# Patient Record
Sex: MALE | Race: WHITE
[De-identification: names, ages, dates, MRNs, and addresses within clinical notes are randomized per-mention and may not be internally consistent; named-entity substitution may affect disease eponyms.]

---

## 2017-01-09 ENCOUNTER — HOSPITAL ENCOUNTER (OUTPATIENT)
Dept: HOSPITAL 45 - C.LAB | Age: 67
Discharge: HOME | End: 2017-01-09
Attending: UROLOGY
Payer: COMMERCIAL

## 2017-01-09 DIAGNOSIS — R97.20: Primary | ICD-10-CM

## 2017-01-09 LAB
PSA FREE MFR SERPL: 12.2 %
PSA FREE SERPL-MCNC: 1.5 NG/ML
PSA SERPL-MCNC: 12.3 NG/ML (ref 0–4)

## 2017-01-11 ENCOUNTER — HOSPITAL ENCOUNTER (OUTPATIENT)
Dept: HOSPITAL 45 - C.PATHSPEC | Age: 67
Discharge: HOME | End: 2017-01-11
Attending: UROLOGY
Payer: COMMERCIAL

## 2017-01-11 DIAGNOSIS — C61: Primary | ICD-10-CM

## 2017-01-17 ENCOUNTER — HOSPITAL ENCOUNTER (OUTPATIENT)
Dept: HOSPITAL 45 - C.LAB | Age: 67
Discharge: HOME | End: 2017-01-17
Attending: UROLOGY
Payer: COMMERCIAL

## 2017-01-17 DIAGNOSIS — R39.15: ICD-10-CM

## 2017-01-17 DIAGNOSIS — C61: ICD-10-CM

## 2017-01-17 DIAGNOSIS — R97.20: Primary | ICD-10-CM

## 2017-01-19 ENCOUNTER — HOSPITAL ENCOUNTER (OUTPATIENT)
Dept: HOSPITAL 45 - C.NUCL | Age: 67
Discharge: HOME | End: 2017-01-19
Attending: UROLOGY
Payer: COMMERCIAL

## 2017-01-19 DIAGNOSIS — C61: Primary | ICD-10-CM

## 2017-01-19 LAB
BUN SERPL-MCNC: 14 MG/DL (ref 7–18)
BUN/CREAT SERPL: 15.2 (ref 10–20)
CREAT SERPL-MCNC: 0.92 MG/DL (ref 0.6–1.4)

## 2017-01-19 NOTE — DIAGNOSTIC IMAGING REPORT
BONE SCAN WHOLE BODY



CLINICAL HISTORY: Prostate carcinoma    



COMPARISON STUDY:  No previous studies for comparison.



FINDINGS: The patient was injected with 27 mCi of technetium 99 M MDP.

Three-hour delayed whole body images were acquired. Skeletal uptake appears

symmetric. There are no foci of increased activity viewed as suspicious for

metastatic disease.



IMPRESSION:  No scintigraphic evidence of skeletal metastasis. 









Electronically signed by:  Thee Carrillo M.D.

1/19/2017 3:48 PM



Dictated Date/Time:  1/19/2017 3:47 PM

## 2017-01-23 ENCOUNTER — HOSPITAL ENCOUNTER (OUTPATIENT)
Dept: HOSPITAL 45 - C.CTS | Age: 67
Discharge: HOME | End: 2017-01-23
Attending: UROLOGY
Payer: COMMERCIAL

## 2017-01-23 DIAGNOSIS — C61: Primary | ICD-10-CM

## 2017-01-23 NOTE — DIAGNOSTIC IMAGING REPORT
CT OF THE ABDOMEN AND PELVIS WITH CONTRAST



CLINICAL HISTORY: Prostate cancer.    



COMPARISON STUDY:  Whole-body bone scan January 19, 2017.



TECHNIQUE: Following IV administration of 91 mL of Optiray-320, axial images of

the abdomen and pelvis were obtained from the lung bases to the proximal femurs.

Images were reviewed in the axial, sagittal, and coronal planes. IV contrast was

administered without complication.



CT DOSE: 317.06 mGy.cm



FINDINGS: Lung bases are clear. Several subcentimeter hepatic lesions are too

small to characterize but likely reflect cysts. The spleen, adrenal glands and

pancreas are normal. There are numerous bilateral renal cysts, the largest of

which is a 5.2 cm cyst arising from the lower pole of the right kidney. There is

no hydronephrosis. Caliber and wall thickness of small and large bowel are

normal. The appendix is normal. There is a left-sided inferior vena cava. No

enlarged abdominal or pelvic lymph nodes are present. There are findings

suggestive of a left inguinal hernia repair. Note is made of a subtle 2.9 cm

enhancing focus within the left aspect of the prostate gland. No suspicious

osseous lesions are present. There is no hydronephrosis.







IMPRESSION:  



1. No evidence of metastatic disease within the abdomen or pelvis.



2. Subtle 2.9 cm enhancing focus within the left aspect of the prostate gland.

Although suboptimally assessed by CT, this could reflect the known primary

tumor.



3. Multiple bilateral renal cysts.







Electronically signed by:  Donny Snyder M.D.

1/23/2017 9:49 AM



Dictated Date/Time:  1/23/2017 9:36 AM

## 2017-02-27 LAB
ANION GAP SERPL CALC-SCNC: 4 MMOL/L (ref 3–11)
APPEARANCE UR: CLEAR
BASOPHILS # BLD: 0.01 K/UL (ref 0–0.2)
BASOPHILS NFR BLD: 0.2 %
BILIRUB UR-MCNC: (no result) MG/DL
BUN SERPL-MCNC: 17 MG/DL (ref 7–18)
BUN/CREAT SERPL: 16.9 (ref 10–20)
CALCIUM SERPL-MCNC: 9 MG/DL (ref 8.5–10.1)
CHLORIDE SERPL-SCNC: 103 MMOL/L (ref 98–107)
CO2 SERPL-SCNC: 33 MMOL/L (ref 21–32)
COLOR UR: YELLOW
COMPLETE: YES
CREAT CL PREDICTED SERPL C-G-VRATE: 74.6 ML/MIN
CREAT SERPL-MCNC: 1 MG/DL (ref 0.6–1.4)
EOSINOPHIL NFR BLD AUTO: 164 K/UL (ref 130–400)
GLUCOSE SERPL-MCNC: 99 MG/DL (ref 70–99)
HCT VFR BLD CALC: 43.3 % (ref 42–52)
IG%: 0.2 %
IMM GRANULOCYTES NFR BLD AUTO: 32.9 %
LYMPHOCYTES # BLD: 1.59 K/UL (ref 1.2–3.4)
MANUAL MICROSCOPIC REQUIRED?: NO
MCH RBC QN AUTO: 31.8 PG (ref 25–34)
MCHC RBC AUTO-ENTMCNC: 34.6 G/DL (ref 32–36)
MCV RBC AUTO: 91.7 FL (ref 80–100)
MONOCYTES NFR BLD: 9.1 %
NEUTROPHILS # BLD AUTO: 1.4 %
NEUTROPHILS NFR BLD AUTO: 56.2 %
NITRITE UR QL STRIP: (no result)
PH UR STRIP: 5 [PH] (ref 4.5–7.5)
PMV BLD AUTO: 9.3 FL (ref 7.4–10.4)
POTASSIUM SERPL-SCNC: 4.7 MMOL/L (ref 3.5–5.1)
RBC # BLD AUTO: 4.72 M/UL (ref 4.7–6.1)
REVIEW REQ?: NO
SODIUM SERPL-SCNC: 140 MMOL/L (ref 136–145)
SP GR UR STRIP: 1.02 (ref 1–1.03)
URINE BILL WITH OR WITHOUT MIC: (no result)
UROBILINOGEN UR-MCNC: (no result) MG/DL
WBC # BLD AUTO: 4.84 K/UL (ref 4.8–10.8)

## 2017-02-27 NOTE — PAT MEDICATION INSTRUCTIONS
Service Date


Feb 27, 2017.





Current Home Medication List


Atorvastatin (Lipitor), 1 TAB PO QAM


Calcium Carbonate (Tums), 1-2 TAB PO PRN


Ocuvite Preservision (Ocuvite Preservision), 1 TAB PO QAM





Medication Instructions


For Your Scheduled Surgery 








- Hold the following medications the morning of surgery:


   Calcium Carbonate (Tums), 1-2 TAB PO PRN


   Ocuvite Preservision (Ocuvite Preservision), 1 TAB PO QAM





- Take the following medications the evening prior to surgery:


   Atorvastatin (Lipitor), 1 TAB PO QPM














If you have any questions please call us at [phone] or [phone] (

Kacie) or [phone]

## 2017-02-27 NOTE — DIAGNOSTIC IMAGING REPORT
TWO VIEW CHEST



CLINICAL HISTORY: Preoperative examination.



FINDINGS: PA and lateral chest radiographs are obtained. No prior studies are

available for comparison at the time of dictation.  The cardiomediastinal

silhouette is unremarkable. The lungs appear hyperinflated and hyperlucent with

flattening the diaphragm and increased retrosternal clear space. The appearance

suggests emphysema. Nonspecific interstitial thickening is noted. There is no

airspace consolidation, large pleural effusion, or pneumothorax. The skeletal

structures are osteopenic. Mild degenerative change is noted throughout the

thoracic spine.



IMPRESSION: Findings suggest emphysema. There is no acute cardiopulmonary

abnormality.







Electronically signed by:  Christiano Jessica M.D.

2/27/2017 9:56 AM



Dictated Date/Time:  2/27/2017 9:55 AM

## 2017-03-07 ENCOUNTER — HOSPITAL ENCOUNTER (INPATIENT)
Dept: HOSPITAL 45 - C.ACU | Age: 67
LOS: 1 days | Discharge: HOME | DRG: 708 | End: 2017-03-08
Attending: UROLOGY | Admitting: UROLOGY
Payer: COMMERCIAL

## 2017-03-07 VITALS
HEART RATE: 97 BPM | OXYGEN SATURATION: 98 % | TEMPERATURE: 98.24 F | SYSTOLIC BLOOD PRESSURE: 117 MMHG | DIASTOLIC BLOOD PRESSURE: 63 MMHG

## 2017-03-07 VITALS
SYSTOLIC BLOOD PRESSURE: 142 MMHG | OXYGEN SATURATION: 97 % | TEMPERATURE: 97.7 F | HEART RATE: 81 BPM | DIASTOLIC BLOOD PRESSURE: 68 MMHG

## 2017-03-07 VITALS
SYSTOLIC BLOOD PRESSURE: 109 MMHG | DIASTOLIC BLOOD PRESSURE: 57 MMHG | TEMPERATURE: 98.42 F | OXYGEN SATURATION: 94 % | HEART RATE: 67 BPM

## 2017-03-07 VITALS — SYSTOLIC BLOOD PRESSURE: 147 MMHG | OXYGEN SATURATION: 97 % | TEMPERATURE: 98.24 F | DIASTOLIC BLOOD PRESSURE: 89 MMHG

## 2017-03-07 VITALS
BODY MASS INDEX: 23.12 KG/M2 | HEIGHT: 71 IN | BODY MASS INDEX: 23.12 KG/M2 | WEIGHT: 165.13 LBS | WEIGHT: 165.13 LBS | HEIGHT: 71 IN

## 2017-03-07 VITALS
TEMPERATURE: 97.88 F | HEART RATE: 80 BPM | OXYGEN SATURATION: 98 % | SYSTOLIC BLOOD PRESSURE: 141 MMHG | DIASTOLIC BLOOD PRESSURE: 73 MMHG

## 2017-03-07 VITALS
SYSTOLIC BLOOD PRESSURE: 110 MMHG | OXYGEN SATURATION: 94 % | TEMPERATURE: 98.42 F | DIASTOLIC BLOOD PRESSURE: 51 MMHG | HEART RATE: 83 BPM

## 2017-03-07 VITALS
HEART RATE: 74 BPM | OXYGEN SATURATION: 98 % | SYSTOLIC BLOOD PRESSURE: 157 MMHG | DIASTOLIC BLOOD PRESSURE: 78 MMHG | TEMPERATURE: 97.52 F

## 2017-03-07 VITALS
HEART RATE: 76 BPM | SYSTOLIC BLOOD PRESSURE: 153 MMHG | OXYGEN SATURATION: 98 % | TEMPERATURE: 97.34 F | DIASTOLIC BLOOD PRESSURE: 85 MMHG

## 2017-03-07 DIAGNOSIS — Z79.899: ICD-10-CM

## 2017-03-07 DIAGNOSIS — R39.15: ICD-10-CM

## 2017-03-07 DIAGNOSIS — M19.90: ICD-10-CM

## 2017-03-07 DIAGNOSIS — E78.5: ICD-10-CM

## 2017-03-07 DIAGNOSIS — Z80.52: ICD-10-CM

## 2017-03-07 DIAGNOSIS — C61: Primary | ICD-10-CM

## 2017-03-07 LAB
ANION GAP SERPL CALC-SCNC: 9 MMOL/L (ref 3–11)
BUN SERPL-MCNC: 15 MG/DL (ref 7–18)
BUN/CREAT SERPL: 15.4 (ref 10–20)
CALCIUM SERPL-MCNC: 8.4 MG/DL (ref 8.5–10.1)
CHLORIDE SERPL-SCNC: 105 MMOL/L (ref 98–107)
CO2 SERPL-SCNC: 26 MMOL/L (ref 21–32)
CREAT CL PREDICTED SERPL C-G-VRATE: 74.6 ML/MIN
CREAT SERPL-MCNC: 1 MG/DL (ref 0.6–1.4)
EOSINOPHIL NFR BLD AUTO: 133 K/UL (ref 130–400)
GLUCOSE SERPL-MCNC: 126 MG/DL (ref 70–99)
HCT VFR BLD CALC: 40 % (ref 42–52)
MCH RBC QN AUTO: 31.4 PG (ref 25–34)
MCHC RBC AUTO-ENTMCNC: 34.8 G/DL (ref 32–36)
MCV RBC AUTO: 90.3 FL (ref 80–100)
PMV BLD AUTO: 8.9 FL (ref 7.4–10.4)
POTASSIUM SERPL-SCNC: 4.2 MMOL/L (ref 3.5–5.1)
RBC # BLD AUTO: 4.43 M/UL (ref 4.7–6.1)
SODIUM SERPL-SCNC: 140 MMOL/L (ref 136–145)
WBC # BLD AUTO: 6.65 K/UL (ref 4.8–10.8)

## 2017-03-07 PROCEDURE — 0VT34ZZ RESECTION OF BILATERAL SEMINAL VESICLES, PERCUTANEOUS ENDOSCOPIC APPROACH: ICD-10-PCS | Performed by: UROLOGY

## 2017-03-07 PROCEDURE — 8E0W4CZ ROBOTIC ASSISTED PROCEDURE OF TRUNK REGION, PERCUTANEOUS ENDOSCOPIC APPROACH: ICD-10-PCS | Performed by: UROLOGY

## 2017-03-07 PROCEDURE — 07BC4ZX EXCISION OF PELVIS LYMPHATIC, PERCUTANEOUS ENDOSCOPIC APPROACH, DIAGNOSTIC: ICD-10-PCS | Performed by: UROLOGY

## 2017-03-07 PROCEDURE — 0VT04ZZ RESECTION OF PROSTATE, PERCUTANEOUS ENDOSCOPIC APPROACH: ICD-10-PCS | Performed by: UROLOGY

## 2017-03-07 RX ADMIN — HEPARIN SODIUM SCH UNIT: 10000 INJECTION, SOLUTION INTRAVENOUS; SUBCUTANEOUS at 22:27

## 2017-03-07 RX ADMIN — ACETAMINOPHEN SCH MG: 500 TABLET, COATED ORAL at 22:24

## 2017-03-07 RX ADMIN — SODIUM CHLORIDE, SODIUM LACTATE, POTASSIUM CHLORIDE, AND CALCIUM CHLORIDE SCH MLS/HR: 600; 310; 30; 20 INJECTION, SOLUTION INTRAVENOUS at 16:09

## 2017-03-07 RX ADMIN — CEFAZOLIN SCH MLS/HR: 10 INJECTION, POWDER, FOR SOLUTION INTRAVENOUS at 20:37

## 2017-03-07 RX ADMIN — DOCUSATE SODIUM SCH MG: 100 CAPSULE, LIQUID FILLED ORAL at 20:39

## 2017-03-07 RX ADMIN — SODIUM CHLORIDE, SODIUM LACTATE, POTASSIUM CHLORIDE, AND CALCIUM CHLORIDE SCH MLS/HR: 600; 310; 30; 20 INJECTION, SOLUTION INTRAVENOUS at 20:38

## 2017-03-07 RX ADMIN — ACETAMINOPHEN SCH MG: 500 TABLET, COATED ORAL at 16:42

## 2017-03-07 NOTE — [facility] POST OPERATIVE BRIEF NOTE
Immediate Operative Summary


Operative Date


Mar 7, 2017.





Pre-Operative Diagnosis





Prostate cancer





Post-Operative Diagnosis





Same





Procedure(s) Performed





Robotic Assisted Laparoscopic Prostatectomy with Lymph node dissection





Surgeon


Dr Ku





Assistant Surgeon(s)


Naa REIS





Estimated Blood Loss


100ML





Findings


As per dictation.





Specimens





A. Periprosthetic fat





B. Prostate and seminal vesicles





C. Right pelvic lymph node has clip





D. Bladder neck margin





E. Left pelvic lymph node





F. Apical margin





Drains


ESSENCE; potter





Anesthesia


Gen





Complication(s)


None





Disposition


Recovery Room / PACU (stable)

## 2017-03-07 NOTE — ANESTHESIOLOGY PROGRESS NOTE
Anesthesia Post Op Note


Date & Time


Mar 7, 2017 at 16:24





Vital Signs


Pain Intensity:  0





 Vital Signs Past 12 Hours








  Date Time  Temp Pulse Resp B/P Pulse Ox O2 Delivery O2 Flow Rate FiO2


 


3/7/17 16:12 36.3 76 17 153/85 98 Nasal Cannula 2.0 


 


3/7/17 15:25  71 20 146/72 99 Nasal Cannula 2 


 


3/7/17 15:15 36.4 71 18 153/79 98 Nasal Cannula 2 


 


3/7/17 15:05  77 14 159/90 98 Nasal Cannula 2 


 


3/7/17 14:55  76 12 157/86 99 Mask 10 


 


3/7/17 14:45  79 16 160/77 100 Mask 10 


 


3/7/17 14:37 36.7 92 16 160/99 98 Mask 10 


 


3/7/17 10:42 36.8  16 147/89 97 Room Air  











Notes


Mental Status:  alert / awake / arousable, participated in evaluation


Pt Amnestic to Procedure:  Yes


Nausea / Vomiting:  adequately controlled


Pain:  adequately controlled


Airway Patency, RR, SpO2:  stable & adequate


BP & HR:  stable & adequate


Hydration State:  stable & adequate


Anesthetic Complications:  no major complications apparent

## 2017-03-07 NOTE — OPERATIVE REPORT
DATE OF OPERATION:  03/07/2017

 

PREOPERATIVE DIAGNOSIS:  Prostate cancer.

 

POSTOPERATIVE DIAGNOSIS:  Prostate cancer.

 

PROCEDURE PERFORMED:  Robotic assisted laparoscopic radical prostatectomy

with bilateral pelvic lymph node dissection.

 

ANESTHESIA:  General.

 

ESTIMATED BLOOD LOSS:  100 mL.

 

URINE OUTPUT:  Not recorded.

 

SPECIMENS:

1.  Periprostatic fat.

2.  Prostate and seminal vesicle.

3.  Right pelvic lymph node.

4.  Left pelvic lymph node.

5.  Bladder neck with prominent margin.

6.  [place] permanent margin.

 

DRAINS:

1.  ESSENCE drain.

2.  Head catheter.

 

PRIMARY SURGEON:  Dr. Krishna Ku.

 

ASSISTANT:  Naa Deutsch.

 

DESCRIPTION OF THE PROCEDURE:  Chava Boswell was identified in the

preoperative holding area.  Appropriate informed consents were reviewed and

completed and the patient was transported to the operating suite.  Upon

arrival, he received appropriate preoperative antibiotics in the form of

Ancef as well as a subcutaneous dose of heparin for DVT prophylaxis. 

Adequate general anesthesia was achieved and the patient was placed in the

dorsal lithotomy position where he was sterilely prepped and draped in a

standard fashion.  I began the case by passing a Veress per umbilicus, and

insufflating the abdomen to 15 mmHg.  I then entered in the infraumbilical

site utilizing a 10 mm 0 degrees lens and a 12 mm Visiport.  Inspection

revealed no evidence of Veress trauma, no gross adhesive disease to preclude

us from placing ports in standard prostatectomy fashion.  These ports were

placed under direct vision without difficulty.  Of note, the patient has had

3 left-sided inguinal hernia repairs and 1 right-sided inguinal hernia

repair.  He is status post left orchiectomy.  He has moderate scarring in the

deep aspects of the pelvis with mesh clearly visible on the left side but

less visible on the right.  I began the case by mobilizing the lateral aspect

of the sigmoid colon to free the left lateral pelvic wall.  After this opened

the pouch of Gilberto, I was able to turn my attention towards control of the

umbilical ligaments just inferior to the umbilicus.  I began on the right.  I

controlled this and then I incised the peritoneum just lateral to it down

towards the medial aspect of the right internal ring.  This incision was

carried down to this area and I stopped my dissection at that point secondary

to some scarring from his prior hernia repair.  I performed the same

procedure on the left controlling the umbilical ligaments just inferior to

the umbilicus, then carrying incision lateral to it through the peritoneum

down to the medial aspect of the internal ring and the vas deferens on the

left.  I then turned my attention to the midline and I carried my dissection

midline between the 2 prior hernia resections until I was able to identify

the pubic symphysis dissect beneath it until I uncovered the anterior surface

of the prostate.  I then worked from medial to lateral to connect my lateral

peritoneal incision and this medial midline incision.  I carefully worked

around the prior hernia repairs and freed the bladder without any

identifiable cystotomies or other complications.  After this portion of the

procedure was completed, I was able to defat the prostate in standard fashion

and passed this off the table as a specimen.  I then opened the endopelvic

fascia, first on the right and the left beginning at the base of the prostate

and moving to the apex.  I was able to preserve all lateral levator

musculature as well as the periurethral musculature.  I placed a dorsal

venous complex stitch utilizing a 0 Vicryl stitch in figure-of-eight fashion.

 There was excellent hemostasis.  I did pack the area next to this with 2

small sheets of Surgicel while I turned my attention to the bilateral lymph

node dissection.

 

I began on the right side.  Anatomically, he has had some changes secondary

to the hernia repair; however, was able to easily identify the right external

iliac artery and just distal to it the iliac vein.  I controlled the

lymphatic packet just below the vein and dissected it behind the vein as well

as lateral.  I carried this as far circumflex vein and distally as far as the

obturator nerve.  Care was used to preserve the nerve.  A clip was placed on

the proximal extent of this packet adjacent to the external iliac closer to

the bifurcation of the iliac vessels.  The specimen was marked with a clip

and placed in the deep pelvis.  I then turned my attention to the left. 

Similar to the right side his anatomy has been distorted secondary to his 3

prior hernia repairs.  I was able to identify the vasculature and utilized

these as my consistent landmark.  I dissected the lymphatic packet off the

inferior and posterior aspect of the external iliac vein and carrying this

again laterally as far, the circumflex vein and distally as far as the

obturator nerve.  The nerve was spared entirely and the proximal extent of

this lymphatic packet was controlled with a Weck clip.  The specimen was left

unmarked and the 2 specimens were collected together in an EndoCatch bag and

passed to the upper abdomen.  I then turned my attention back to the prostate

and bladder neck.  With gentle traction on the Head catheter as well as

lateral to medial traction for my instruments, I was able to demarcate the

bladder neck.  I made an incision anterior just above the anticipated

location of the bladder neck and carried this down until I created a

cystotomy.  I then carefully preserved the bladder neck musculature laterally

before completing my incision of the bladder neck.  I deflated the Head

catheter, passed a balloon through the cystotomy and used it to apply

anterior traction.  I dissected posteriorly to the bladder neck with care to

avoid thinning the bladder.  I carried this posteriorly until I encountered

the bilateral ampule of the vasa.  These were both dissected for

approximately 4 cm before being transected.  I utilized these to help retract

the prostate anteriorly while I dissected the bilateral seminal vesicles. 

There were no complications for this portion of the procedure.  I dissected

posteriorly to the prostate splitting Denonvilliers' fascia with care to

avoid encroachment upon the prostate on the left as this is the side that has

the majority of his cancer.  This dissection was carried distally as far as

possible and laterally as far as possible.  I began a nerve sparing

dissection on the right side from the posterior aspect thinning this pedicle.

 I then controlled the vascular pedicle utilizing a series of Weck clips on

the right before performing a nerve sparing procedure and preserving the vast

majority of the neurovascular bundle on the right.  This preservation was

carried beyond the apex of the prostate and alongside the urethra.  On the

left, I took a slightly wider path and after controlling the vascular pedicle

with a series of Weck clips I split the neurovascular bundle to ensure better

x-ray insulation on the prostate.  This again was carried distal to the

prostate and alongside the urethra.  I controlled the dorsal venous complex

at that time utilizing bipolar electrocautery and I dissected the apex of the

prostate with a combination of cold scissors and monopolar electrocautery.  I

transected the urethra cold over a Head catheter with excellent preservation

of length.  Of note, I did take a small margin of tissue from the apex as

well as a small margin tissue from the bladder neck to be sent for permanent

final margins.  I began a running anastomosis using a double armed V-Loc

stitch beginning at the posterior bladder neck and running this until the

anterior surface of the bladder.  We tested the anastomosis after the

completion and found no evidence of leak.  FloSeal was placed around the

anastomosis and a ESSENCE drain was guided into the left lateral most port.  We

then extracted the specimen through expansion of the infraumbilical incision.

 We closed this incision ultimately with 3 figure-of-eight 0 PDS stitches. 

We infiltrated all incisions with 0.5% Marcaine and closed all incisions with

4-0 Monocryl and Dermabond.  At that time the patient was extubated and taken

to the PACU in stable condition.  There were no complications.

 

 

I attest to the content of the Intraoperative Record and any orders documented therein. Any exceptio
ns are noted below.

## 2017-03-08 VITALS
OXYGEN SATURATION: 97 % | TEMPERATURE: 98.06 F | SYSTOLIC BLOOD PRESSURE: 109 MMHG | DIASTOLIC BLOOD PRESSURE: 59 MMHG | HEART RATE: 64 BPM

## 2017-03-08 VITALS
OXYGEN SATURATION: 97 % | DIASTOLIC BLOOD PRESSURE: 59 MMHG | SYSTOLIC BLOOD PRESSURE: 109 MMHG | HEART RATE: 64 BPM | TEMPERATURE: 98.06 F

## 2017-03-08 VITALS
HEART RATE: 60 BPM | TEMPERATURE: 98.24 F | OXYGEN SATURATION: 98 % | DIASTOLIC BLOOD PRESSURE: 52 MMHG | SYSTOLIC BLOOD PRESSURE: 100 MMHG

## 2017-03-08 VITALS
TEMPERATURE: 98.24 F | SYSTOLIC BLOOD PRESSURE: 111 MMHG | OXYGEN SATURATION: 95 % | DIASTOLIC BLOOD PRESSURE: 51 MMHG | HEART RATE: 65 BPM

## 2017-03-08 VITALS — OXYGEN SATURATION: 98 %

## 2017-03-08 LAB
ANION GAP SERPL CALC-SCNC: 9 MMOL/L (ref 3–11)
BASOPHILS # BLD: 0 K/UL (ref 0–0.2)
BASOPHILS NFR BLD: 0 %
BUN SERPL-MCNC: 14 MG/DL (ref 7–18)
BUN/CREAT SERPL: 14.1 (ref 10–20)
CALCIUM SERPL-MCNC: 7.9 MG/DL (ref 8.5–10.1)
CHLORIDE SERPL-SCNC: 103 MMOL/L (ref 98–107)
CO2 SERPL-SCNC: 28 MMOL/L (ref 21–32)
COMPLETE: YES
CREAT CL PREDICTED SERPL C-G-VRATE: 74.9 ML/MIN
CREAT SERPL-MCNC: 1 MG/DL (ref 0.6–1.4)
EOSINOPHIL NFR BLD AUTO: 142 K/UL (ref 130–400)
GLUCOSE SERPL-MCNC: 112 MG/DL (ref 70–99)
HCT VFR BLD CALC: 35.3 % (ref 42–52)
IG%: 0.1 %
IMM GRANULOCYTES NFR BLD AUTO: 10.7 %
LYMPHOCYTES # BLD: 0.84 K/UL (ref 1.2–3.4)
MCH RBC QN AUTO: 31 PG (ref 25–34)
MCHC RBC AUTO-ENTMCNC: 34.3 G/DL (ref 32–36)
MCV RBC AUTO: 90.5 FL (ref 80–100)
MONOCYTES NFR BLD: 9.3 %
NEUTROPHILS # BLD AUTO: 0 %
NEUTROPHILS NFR BLD AUTO: 79.9 %
PMV BLD AUTO: 8.9 FL (ref 7.4–10.4)
POTASSIUM SERPL-SCNC: 4.3 MMOL/L (ref 3.5–5.1)
RBC # BLD AUTO: 3.9 M/UL (ref 4.7–6.1)
SODIUM SERPL-SCNC: 140 MMOL/L (ref 136–145)
WBC # BLD AUTO: 7.85 K/UL (ref 4.8–10.8)

## 2017-03-08 RX ADMIN — HEPARIN SODIUM SCH UNIT: 10000 INJECTION, SOLUTION INTRAVENOUS; SUBCUTANEOUS at 09:09

## 2017-03-08 RX ADMIN — ACETAMINOPHEN SCH MG: 500 TABLET, COATED ORAL at 03:14

## 2017-03-08 RX ADMIN — CEFAZOLIN SCH MLS/HR: 10 INJECTION, POWDER, FOR SOLUTION INTRAVENOUS at 12:42

## 2017-03-08 RX ADMIN — SODIUM CHLORIDE, SODIUM LACTATE, POTASSIUM CHLORIDE, AND CALCIUM CHLORIDE SCH MLS/HR: 600; 310; 30; 20 INJECTION, SOLUTION INTRAVENOUS at 03:22

## 2017-03-08 RX ADMIN — ACETAMINOPHEN SCH MG: 500 TABLET, COATED ORAL at 09:04

## 2017-03-08 RX ADMIN — KETOROLAC TROMETHAMINE PRN MG: 15 INJECTION INTRAMUSCULAR; INTRAVENOUS at 13:55

## 2017-03-08 RX ADMIN — CEFAZOLIN SCH MLS/HR: 10 INJECTION, POWDER, FOR SOLUTION INTRAVENOUS at 03:16

## 2017-03-08 RX ADMIN — DOCUSATE SODIUM SCH MG: 100 CAPSULE, LIQUID FILLED ORAL at 09:05

## 2017-03-08 RX ADMIN — KETOROLAC TROMETHAMINE PRN MG: 15 INJECTION INTRAMUSCULAR; INTRAVENOUS at 03:18

## 2017-03-08 NOTE — DISCHARGE INSTRUCTIONS
Discharge Instructions


Date of Service


Mar 8, 2017.





Admission


Reason for Admission:  Prostate Cancer





Discharge


Discharge Diagnosis / Problem:  Prostate cancer





Discharge Goals


Goal(s):  Decrease discomfort, Increase independence, Improve disease control, 

Therapeutic intervention





Activity Recommendations


Activity Limitations:  as noted below


Shower/Bathe:  tomorrow





.





Instructions / Follow-Up


Instructions / Follow-Up





1.  Do not lift >15lbs x 6 weeks. 





2.  No heavy exercise x 6 weeks. You may engage in light activity such as 

walking and stairs as tolerated. 





3.  No sexual intercourse until cleared by Dr. Everett or Dr. Ku. 





4.  Do not drive x 1 week. Do not drive while taking narcotics. 





5.  You have been prescribed the antibiotic Ciprofloxacin. Start this 

medication 2 days prior to potter catheter removal. Finish all of the antibiotic 

you have been prescribed. 





6.  Immediately call our office at [phone] if your catheter is removed for 

any reason. 





7.  Follow-up as scheduled. Please call our office at [phone] if you need 

to reschedule for any reason.


.





Current Hospital Diet


Hospital Diet(s):  Regular Diet





Discharge Diet


Recommended Diet:  Regular Diet





Procedures


Procedures Performed:  


Robotic Assisted Laparoscopic Prostatectomy with Lymph node dissection





Pending Studies


Studies pending at discharge:  yes


List of pending studies:  


prostate pathology





Medical Emergencies








.


Who to Call and When:





Medical Emergencies:  If at any time you feel your situation is an emergency, 

please call 911 immediately.





.





Non-Emergent Contact


Non-Emergency issues call your:  Urologist


Call Non-Emergent contact if:  temperature is above 101.5, your pain is not 

controlled, your pain is worsening, your pain is unusual for you, your pain is 

concerning you, you have any medication questions





.


.





"Provider Documentation" section prepared by Naa Deutsch.





VTE Core Measure


Inpt VTE Proph given/why not?:  Unfractionated heparin SQ, SCD's





[place] Drug Monitoring Program


Search Results:  patient reviewed within database, no issues identified

## 2017-03-08 NOTE — ANESTHESIOLOGY PROGRESS NOTE
Anesthesia Post Op Note


Date & Time


Mar 8, 2017 at 08:26





Vital Signs


Pain Intensity:  2.0





 Vital Signs Past 12 Hours








  Date Time  Temp Pulse Resp B/P Pulse Ox O2 Delivery O2 Flow Rate FiO2


 


3/8/17 08:23 36.8 60 17 100/52 98 Room Air  


 


3/8/17 07:10      Room Air  


 


3/8/17 03:01 36.8 65 16 111/51 95 Room Air  


 


3/8/17 00:30      Room Air  


 


3/7/17 22:45 36.9 67 17 109/57 94 Room Air  











Notes


Mental Status:  alert / awake / arousable, participated in evaluation


Pt Amnestic to Procedure:  Yes


Nausea / Vomiting:  adequately controlled


Pain:  adequately controlled


Airway Patency, RR, SpO2:  stable & adequate


BP & HR:  stable & adequate


Hydration State:  stable & adequate


Anesthetic Complications:  no major complications apparent

## 2017-03-08 NOTE — PROGRESS NOTE
Subjective


Date of Service:


Mar 8, 2017.


Subjective


Pt evaluation today including:  conversation w/ patient, chart review, lab 

review


Voiding:  potter catheter in place (patent, draining patti colored urine with 

some old clot)


67 yo male s/p RALRP. 


Pt denies pain this morning. Reports he feels well. 


He reports he has been ambulating to the hallway without difficulty. 


Denies n/v. 


Denies passing flatus or BM. 


Labs stable. 


I&Os acceptable.





Review of Systems


Constitutional:  No chills, No fever


Respiratory:  No shortness of breath


Cardiac:  No chest pain


Abdomen:  No nausea, No pain, No vomiting


Male :  No dysuria, No hematuria


Heme:  No abnormal bleeding/bruising





Objective


Vital Signs











  Date Time  Temp Pulse Resp B/P Pulse Ox O2 Delivery O2 Flow Rate FiO2


 


3/8/17 03:01 36.8 65 16 111/51 95 Room Air  


 


3/8/17 00:30      Room Air  


 


3/7/17 22:45 36.9 67 17 109/57 94 Room Air  


 


3/7/17 19:55 36.9 83 17 110/51 94 Room Air  


 


3/7/17 18:34 36.8 97 18 117/63 98 Nasal Cannula 2.0 


 


3/7/17 17:37 36.5 81 16 142/68 97 Nasal Cannula 2.0 


 


3/7/17 16:37 36.6 80 17 141/73 98 Nasal Cannula 2.0 


 


3/7/17 16:12 36.3 76 17 153/85 98 Nasal Cannula 2.0 


 


3/7/17 15:35     98 Nasal Cannula 2.0 


 


3/7/17 15:35 36.4 74 18 157/78 98 Nasal Cannula 2.0 


 


3/7/17 15:35      Nasal Cannula 2.0 


 


3/7/17 15:25  71 20 146/72 99 Nasal Cannula 2 


 


3/7/17 15:15 36.4 71 18 153/79 98 Nasal Cannula 2 


 


3/7/17 15:05  77 14 159/90 98 Nasal Cannula 2 


 


3/7/17 14:55  76 12 157/86 99 Mask 10 


 


3/7/17 14:45  79 16 160/77 100 Mask 10 


 


3/7/17 14:37 36.7 92 16 160/99 98 Mask 10 


 


3/7/17 10:42 36.8  16 147/89 97 Room Air  











Physical Exam


General Appearance:  no apparent distress


Eyes:  normal inspection


ENT:  hearing grossly normal


Neck:  no JVD


Respiratory/Chest:  no respiratory distress, no accessory muscle use


Cardiovascular:  no JVD


Abdomen:  soft, + pertinent finding (abdominal incisions c/d/i; ESSENCE draining 

serosanguinous fluid)


Extremities:  normal inspection


Neurologic/Psychiatric:  alert, normal mood/affect, oriented x 3


Skin:  normal color





Laboratory Results





Last 24 Hours








Test


  3/7/17


14:58 3/8/17


06:20


 


White Blood Count 6.65 K/uL  7.85 K/uL 


 


Red Blood Count 4.43 M/uL  3.90 M/uL 


 


Hemoglobin 13.9 g/dL  12.1 g/dL 


 


Hematocrit 40.0 %  35.3 % 


 


Mean Corpuscular Volume 90.3 fL  90.5 fL 


 


Mean Corpuscular Hemoglobin 31.4 pg  31.0 pg 


 


Mean Corpuscular Hemoglobin


Concent 34.8 g/dl 


  34.3 g/dl 


 


 


RDW Standard Deviation 43.8 fL  43.3 fL 


 


RDW Coefficient of Variation 13.2 %  13.2 % 


 


Platelet Count 133 K/uL  142 K/uL 


 


Mean Platelet Volume 8.9 fL  8.9 fL 


 


Sodium Level 140 mmol/L  140 mmol/L 


 


Potassium Level 4.2 mmol/L  4.3 mmol/L 


 


Chloride Level 105 mmol/L  103 mmol/L 


 


Carbon Dioxide Level 26 mmol/L  28 mmol/L 


 


Anion Gap 9.0 mmol/L  9.0 mmol/L 


 


Blood Urea Nitrogen 15 mg/dl  14 mg/dl 


 


Creatinine 1.00 mg/dl  1.00 mg/dl 


 


Est Creatinine Clear Calc


Drug Dose 74.6 ml/min 


  74.9 ml/min 


 


 


Estimated GFR (


American) 89.2 


  89.2 


 


 


Estimated GFR (Non-


American 77.0 


  77.0 


 


 


BUN/Creatinine Ratio 15.4  14.1 


 


Random Glucose 126 mg/dl  112 mg/dl 


 


Calcium Level 8.4 mg/dl  7.9 mg/dl 


 


Neutrophils (%) (Auto)  79.9 % 


 


Lymphocytes (%) (Auto)  10.7 % 


 


Monocytes (%) (Auto)  9.3 % 


 


Eosinophils (%) (Auto)  0.0 % 


 


Basophils (%) (Auto)  0.0 % 


 


Neutrophils # (Auto)  6.27 K/uL 


 


Lymphocytes # (Auto)  0.84 K/uL 


 


Monocytes # (Auto)  0.73 K/uL 


 


Eosinophils # (Auto)  0.00 K/uL 


 


Basophils # (Auto)  0.00 K/uL 


 


Immature Granulocyte % (Auto)  0.1 % 


 


Immature Granulocyte # (Auto)  0.01 K/uL 











Assessment and Plan


POD #1 s/p RALRP. 





AFVSS. 


Pt doing well post-op. 


Will advance to a mechanical soft diet for breakfast. 


Hep lock after breakfast if tolerating PO. 


Encourage ambulation to hallway. 


Encourage use of IS. 


Possible d/c home after lunch if pain controlled, tolerating PO, and ambulating 

without difficulty.


Discharge planning:  home

## 2017-03-10 ENCOUNTER — HOSPITAL ENCOUNTER (EMERGENCY)
Dept: HOSPITAL 45 - C.EDB | Age: 67
LOS: 1 days | Discharge: HOME | End: 2017-03-11
Payer: COMMERCIAL

## 2017-03-10 VITALS
WEIGHT: 167.99 LBS | HEIGHT: 70.98 IN | HEIGHT: 70.98 IN | BODY MASS INDEX: 23.52 KG/M2 | WEIGHT: 167.99 LBS | BODY MASS INDEX: 23.52 KG/M2

## 2017-03-10 DIAGNOSIS — M79.604: ICD-10-CM

## 2017-03-10 DIAGNOSIS — E78.5: ICD-10-CM

## 2017-03-10 DIAGNOSIS — K59.00: Primary | ICD-10-CM

## 2017-03-10 DIAGNOSIS — R10.9: ICD-10-CM

## 2017-03-10 DIAGNOSIS — Z79.899: ICD-10-CM

## 2017-03-10 DIAGNOSIS — Z85.46: ICD-10-CM

## 2017-03-10 DIAGNOSIS — M79.89: ICD-10-CM

## 2017-03-10 DIAGNOSIS — G89.18: ICD-10-CM

## 2017-03-10 DIAGNOSIS — M79.605: ICD-10-CM

## 2017-03-11 VITALS
SYSTOLIC BLOOD PRESSURE: 145 MMHG | TEMPERATURE: 97.7 F | HEART RATE: 67 BPM | DIASTOLIC BLOOD PRESSURE: 84 MMHG | OXYGEN SATURATION: 96 %

## 2017-03-11 VITALS — OXYGEN SATURATION: 98 %

## 2017-03-11 LAB
ALP SERPL-CCNC: 67 U/L (ref 45–117)
ALT SERPL-CCNC: 46 U/L (ref 12–78)
ANION GAP SERPL CALC-SCNC: 18 MMOL/L (ref 16–25)
ANION GAP SERPL CALC-SCNC: 9 MMOL/L (ref 3–11)
APPEARANCE UR: (no result)
AST SERPL-CCNC: 47 U/L (ref 15–37)
BASOPHILS # BLD: 0 K/UL (ref 0–0.2)
BASOPHILS NFR BLD: 0 %
BILIRUB UR-MCNC: (no result) MG/DL
BUN SERPL-MCNC: 14 MG/DL (ref 7–18)
BUN/CREAT SERPL: 15.4 (ref 10–20)
CA-I BLD-SCNC: 1.14 MMOL/L (ref 1.12–1.32)
CALCIUM SERPL-MCNC: 8.2 MG/DL (ref 8.5–10.1)
CHLORIDE BLD-SCNC: 100 MEQ/L (ref 101–112)
CHLORIDE SERPL-SCNC: 105 MMOL/L (ref 98–107)
CO2 SERPL-SCNC: 30 MMOL/L (ref 21–32)
COLOR UR: (no result)
COMPLETE: YES
CREAT BLD-MCNC: 0.9 MG/DL (ref 0.6–1.3)
CREAT CL PREDICTED SERPL C-G-VRATE: 80.1 ML/MIN
CREAT SERPL-MCNC: 0.94 MG/DL (ref 0.6–1.4)
EOSINOPHIL NFR BLD AUTO: 168 K/UL (ref 130–400)
GLUCOSE SERPL-MCNC: 98 MG/DL (ref 70–99)
HCT VFR BLD AUTO: 36 % (ref 42–52)
HCT VFR BLD CALC: 37.2 % (ref 42–52)
HGB BLD-MCNC: 12.2 G/DL (ref 14–18)
IG%: 0.2 %
IMM GRANULOCYTES NFR BLD AUTO: 28.6 %
INR PPP: 1 (ref 0.9–1.1)
ISTAT CARBON DIOXIDE: 29 MEQ/L (ref 24–31)
LYMPHOCYTES # BLD: 1.46 K/UL (ref 1.2–3.4)
MANUAL MICROSCOPIC REQUIRED?: NO
MCH RBC QN AUTO: 31.4 PG (ref 25–34)
MCHC RBC AUTO-ENTMCNC: 34.1 G/DL (ref 32–36)
MCV RBC AUTO: 91.9 FL (ref 80–100)
MONOCYTES NFR BLD: 10 %
NEUTROPHILS # BLD AUTO: 3.3 %
NEUTROPHILS NFR BLD AUTO: 57.9 %
NITRITE UR QL STRIP: (no result)
PARTIAL THROMBOPLASTIN RATIO: 1.1
PH UR STRIP: 5 [PH] (ref 4.5–7.5)
PMV BLD AUTO: 9.5 FL (ref 7.4–10.4)
POTASSIUM SERPL-SCNC: 4.2 MMOL/L (ref 3.5–5.1)
PROTHROMBIN TIME: 10.6 SECONDS (ref 9–12)
RBC # BLD AUTO: 4.05 M/UL (ref 4.7–6.1)
REVIEW REQ?: NO
SODIUM BLD-SCNC: 141 MEQ/L (ref 135–144)
SODIUM SERPL-SCNC: 144 MMOL/L (ref 136–145)
SP GR UR STRIP: 1.02 (ref 1–1.03)
URINE BILL WITH OR WITHOUT MIC: (no result)
URINE EPITHELIAL CELL AUTO: (no result) /LPF (ref 0–5)
UROBILINOGEN UR-MCNC: (no result) MG/DL
WBC # BLD AUTO: 5.11 K/UL (ref 4.8–10.8)
ZZURINE CULT IF INDIC CATH: NO

## 2017-03-11 NOTE — DIAGNOSTIC IMAGING REPORT
ULTRASOUND VENOUS DOPPLER LWR EXT BILA 



CLINICAL HISTORY: Leg swelling. Recent surgery.    



COMPARISON STUDY:  No previous studies for comparison. 



FINDINGS: Real-time and color flow Doppler imaging were performed. Flow was seen

within the femoral, popliteal and calf veins with no intraluminal thrombus

demonstrated. The saphenous vein is patent.



IMPRESSION: No evidence of lower extremity DVT.







Electronically signed by:  Thee Carrillo M.D.

3/11/2017 7:15 AM



Dictated Date/Time:  3/11/2017 7:15 AM

## 2017-03-11 NOTE — DIAGNOSTIC IMAGING REPORT
CT ABD/PELVIS IV CONTRAST ONLY



CLINICAL HISTORY: Severe lower abdominal pain. History of recent prostatectomy. 

  



COMPARISON STUDY:  1/23/2017



TECHNIQUE: Following the IV administration of 92 mL of Optiray-320, CT scan of

the abdomen and pelvis was performed from the lung bases to the proximal femurs.

Images are reviewed in the axial, sagittal, and coronal planes. IV contrast was

administered without complication.



CT DOSE: 368.80 mGy.cm



FINDINGS:



Lower chest: There are mild basilar atelectatic changes present. There is

subcutaneous air within the left chest wall.



Liver: There are subcentimeter hypodensities, similar to the prior study. These

may reflect cysts.



Gallbladder: Contracted



Spleen: Normal in size and attenuation.



Pancreas: Unremarkable.



Adrenal glands: Unremarkable.



Kidneys: There are multiple bilateral renal cysts. The largest in the right

measures 5.2 cm. The largest in the left measures 3.9 cm.



Bowel: There are no transition zones to indicate bowel obstruction. There are no

findings to indicate acute appendicitis. There are no findings to indicate acute

diverticulitis. There is borderline rectal wall thickening.





Peritoneum: There are droplets of free intraperitoneal air. There is

subcutaneous air within the abdominal wall. There is presacral soft tissue

thickening.



Vasculature: The abdominal aorta is normal in course and caliber.



Adenopathy: None.



Pelvic viscera: There is indwelling Head catheter. There is mild pelvic edema.



Skeletal structures: No destructive osseous lesions are seen.



IMPRESSION:  

1. Subcutaneous air within the abdominal wall and free intraperitoneal air,

likely secondary to recent surgery

2. Presacral soft tissue stranding and thickening. Peroneal edema. These

findings likely relate to recent surgery.

3. Indwelling Head catheter. Air within the bladder wall likely iatrogenic.

Bladder wall thickening likely secondary to a nondistended bladder

4. Borderline rectal wall thickening.

5. No evidence of bowel obstruction







Electronically signed by:  Thee Carrillo M.D.

3/11/2017 7:00 AM



Dictated Date/Time:  3/11/2017 6:53 AM

## 2017-03-11 NOTE — EMERGENCY ROOM VISIT NOTE
History


First contact with patient:  23:39


Chief Complaint:  SWELLING TO EXTREMITY


Stated Complaint:  PROSTRATE REMOVED 03-07,SWOLLEN LEGS,BLOOD IN CATH





History of Present Illness


The patient is a 66 year old male who presents to the Emergency Room with 

complaints of lower down the pain, problems with his catheter and leg swelling 

for the past day who had his prostate removed on the seventh of this month.  

This is done by Dr. Ku urology.  Patient had prostate carcinoma.  He has 

a Head catheter in place.  He is not on antibiotics or blood thinners.  

Patient states tonight he noticed blood in the urine and around his urethral 

meatus.  Patient also has not had a bowel movement since the surgery.  He has 

been taking his Colace.  Patient complains of lower abdominal pain described as 

cramping, ranging in severity 6 out of 10.  Nothing makes it better or worse.  

Patient complains of bilateral lower leg swelling and discomfort for the past 

day.  Patient denies chest pain, dyspnea, fever, chills, nausea, vomiting, 

diarrhea, back pain.  He is tolerating by mouth fluids and food.  Patient 

states he noticed leg swelling tonight when he woke up from his chair where he 

fell asleep watching TV.  His legs were not elevated.  He does not smoke.  No 

recent travel.  No history of blood clots.  He has not been walking much today.





Review of Systems


See HPI for pertinent positives & negatives. A total of 10 systems reviewed and 

were otherwise negative.





Past Medical/Surgical History


Prostate cancer with prostatectomy, hyperlipidemia





Social History


Smoking Status:  Never Smoker





Current/Historical Medications


Scheduled


Atorvastatin (Lipitor), 1 TAB PO QPM


Calcium Carbonate (Tums), 1-2 TAB PO PRN


Ciprofloxacin Hcl (Cipro), 500 MG PO BID


Ocuvite Preservision (Ocuvite Preservision), 1 TAB PO QAM





Scheduled PRN


Acetaminophen/Codeine (Tylenol W/Codeine #3), 1-2 TAB PO Q4H PRN for Pain


Docusate Sodium (Docusate Sodium), 100 MG PO BID PRN for Constipation


Oxybutynin Chloride (Oxybutynin Chloride), 5 MG PO Q8 PRN for BLADDER SPASMS





Allergies


Coded Allergies:  


     No Known Allergies (Unverified , 3/11/17)





Physical Exam


Vital Signs











  Date Time  Temp Pulse Resp B/P Pulse Ox O2 Delivery O2 Flow Rate FiO2


 


3/11/17 02:35  67 18 145/84 96 Room Air  


 


3/11/17 00:29  68 20  96 Room Air  


 


3/11/17 00:10  63      


 


3/11/17 00:08     98 Room Air  


 


3/10/17 23:32 36.5 82 18 135/81 97 Room Air  











Physical Exam


VITALS: Vitals are noted on the nurse's note and reviewed by myself.  Vital 

signs stable.


GENERAL: Pleasant male anxious-appearing, in no acute distress, nondiaphoretic, 

well-developed well-nourished.


SKIN: Surgical incisional site on the abdomen without signs or symptoms of 

infection and appear to be healing nicely The rest of the skin was without 

rashes, erythema, edema, or bruising.  There is no tenting of the skin.  

Capillary reflex less than 2 seconds.


HEAD: Normocephalic atraumatic.  


EARS: External auditory canals clear, tympanic membranes pearly gray without 

erythema or effusion bilaterally.


EYES: Pupils equal round and reactive to light and accommodation.  Conjunctivae 

without injection, sclerae without icterus.  Extraocular movements intact.  


NOSE: Patent, turbinates without inflammation or discharge. 


MOUTH: Mucous membranes moist.    Pharynx without erythema or exudate.  Uvula 

midline.  Airway patent.  Tongue does not deviate.  


NECK: Supple without nuchal rigidity.  No lymphadenopathy.  No thyromegaly.  

Cervical spine is nontender.  No JVD.


HEART: Regular rate and rhythm without murmurs gallops or rubs.


LUNGS: Clear to auscultation bilaterally without wheezes, rales or rhonchi.  No 

dullness to percussion.  No retractions or accessory muscle use.


ABDOMEN: Positive bowel sounds x 4.  Normal tympanic percussion.  Soft, tender 

to palpation lower abdomen, surgical incisional site intact without signs of 

infection, no CVA tenderness, without masses or organomegaly.  [place] sign 

negative.  No guarding or rebound tenderness.


 exam: Normal male external genitalia, catheter in place with minimal amount 

of blood around the urethral meatus with Head catheter draining without 

difficulties


MUSCULOSKELETAL: No muscle atrophy, erythema,  noted.   +1 pitting edema up to 

the mid tib-fib bilaterally.


NEURO: Patient was alert and oriented to person place and time.  Normal 

sensation to light and sharp touch.  No focal neurological deficits.





Medical Decision & Procedures


Laboratory Results


3/11/17 00:00








Red Blood Count 4.05, Mean Corpuscular Volume 91.9, Mean Corpuscular Hemoglobin 

31.4, Mean Corpuscular Hemoglobin Concent 34.1, Mean Platelet Volume 9.5, 

Neutrophils (%) (Auto) 57.9, Lymphocytes (%) (Auto) 28.6, Monocytes (%) (Auto) 

10.0, Eosinophils (%) (Auto) 3.3, Basophils (%) (Auto) 0.0, Neutrophils # (Auto

) 2.96, Lymphocytes # (Auto) 1.46, Monocytes # (Auto) 0.51, Eosinophils # (Auto

) 0.17, Basophils # (Auto) 0.00





3/11/17 00:00

















Test


  3/11/17


00:00 3/11/17


00:03 3/11/17


00:04


 


White Blood Count


  5.11 K/uL


(4.8-10.8) 


  


 


 


Red Blood Count


  4.05 M/uL


(4.7-6.1) 


  


 


 


Hemoglobin


  12.7 g/dL


(14.0-18.0) 


  


 


 


Hematocrit 37.2 % (42-52)   


 


Mean Corpuscular Volume


  91.9 fL


() 


  


 


 


Mean Corpuscular Hemoglobin


  31.4 pg


(25-34) 


  


 


 


Mean Corpuscular Hemoglobin


Concent 34.1 g/dl


(32-36) 


  


 


 


Platelet Count


  168 K/uL


(130-400) 


  


 


 


Mean Platelet Volume


  9.5 fL


(7.4-10.4) 


  


 


 


Neutrophils (%) (Auto) 57.9 %   


 


Lymphocytes (%) (Auto) 28.6 %   


 


Monocytes (%) (Auto) 10.0 %   


 


Eosinophils (%) (Auto) 3.3 %   


 


Basophils (%) (Auto) 0.0 %   


 


Neutrophils # (Auto)


  2.96 K/uL


(1.4-6.5) 


  


 


 


Lymphocytes # (Auto)


  1.46 K/uL


(1.2-3.4) 


  


 


 


Monocytes # (Auto)


  0.51 K/uL


(0.11-0.59) 


  


 


 


Eosinophils # (Auto)


  0.17 K/uL


(0-0.5) 


  


 


 


Basophils # (Auto)


  0.00 K/uL


(0-0.2) 


  


 


 


RDW Standard Deviation


  44.1 fL


(36.4-46.3) 


  


 


 


RDW Coefficient of Variation


  13.1 %


(11.5-14.5) 


  


 


 


Immature Granulocyte % (Auto) 0.2 %   


 


Immature Granulocyte # (Auto)


  0.01 K/uL


(0.00-0.02) 


  


 


 


Prothrombin Time


  10.6 SECONDS


(9.0-12.0) 


  


 


 


Prothromb Time International


Ratio 1.0 (0.9-1.1) 


  


  


 


 


Activated Partial


Thromboplast Time 27.7 SECONDS


(21.0-31.0) 


  


 


 


Partial Thromboplastin Ratio 1.1   


 


Est Creatinine Clear Calc


Drug Dose 80.1 ml/min 


  


  


 


 


Estimated GFR (


American) 96.2 


  


  


 


 


Estimated GFR (Non-


American 83.0 


  


  


 


 


BUN/Creatinine Ratio 15.4 (10-20)   


 


Calcium Level


  8.2 mg/dl


(8.5-10.1) 


  


 


 


Total Bilirubin


  0.4 mg/dl


(0.2-1) 


  


 


 


Direct Bilirubin


  0.1 mg/dl


(0-0.2) 


  


 


 


Aspartate Amino Transf


(AST/SGOT) 47 U/L (15-37) 


  


  


 


 


Alanine Aminotransferase


(ALT/SGPT) 46 U/L (12-78) 


  


  


 


 


Alkaline Phosphatase


  67 U/L


() 


  


 


 


Total Protein


  6.6 gm/dl


(6.4-8.2) 


  


 


 


Albumin


  3.1 gm/dl


(3.4-5.0) 


  


 


 


Bedside Hemoglobin


  


  12.2 g/dl


(14.0-18.0) 


 


 


Bedside Hematocrit  36 % (42-52)  


 


Bedside Sodium


  


  141 mEq/L


(135-144) 


 


 


Bedside Potassium


  


  4.1 mEq/L


(3.3-5.0) 


 


 


Bedside Chloride


  


  100 mEq/L


(101-112) 


 


 


Bedside Total CO2


  


  29 mEq/l


(24-31) 


 


 


Anion Gap


  


  18.0 mmol/L


(16-25) 


 


 


Bedside Blood Urea Nitrogen


  


  14 mg/dl


(7-18) 


 


 


Bedside Creatinine


  


  0.9 mg/dl


(0.6-1.3) 


 


 


Bedside Glucose (other)


  


  103 mg/dl


(70-99) 


 


 


Bedside Ionized Calcium ([place]ory)


  


  1.14 mmol/l


(1.12-1.32) 


 


 


Urine Color   DK YELLOW 


 


Urine Appearance   CLOUDY (CLEAR) 


 


Urine pH   5.0 (4.5-7.5) 


 


Urine Specific Gravity


  


  


  1.020


(1.000-1.030)


 


Urine Protein   2+ (NEG) 


 


Urine Glucose (UA)   NEG (NEG) 


 


Urine Ketones   NEG (NEG) 


 


Urine Occult Blood   3+ (NEG) 


 


Urine Nitrite   NEG (NEG) 


 


Urine Bilirubin   NEG (NEG) 


 


Urine Urobilinogen   NEG (NEG) 


 


Urine Leukocyte Esterase   TRACE (NEG) 


 


Urine WBC (Auto)


  


  


  5-10 /hpf


(0-5)


 


Urine RBC (Auto)   >30 /hpf (0-4) 


 


Urine Hyaline Casts (Auto)   1-5 /lpf (0-5) 


 


Urine Epithelial Cells (Auto)


  


  


  5-10 /lpf


(0-5)


 


Urine Bacteria (Auto)   NEG (NEG) 











Medications Administered











 Medications


  (Trade)  Dose


 Ordered  Sig/Irving


 Route  Start Time


 Stop Time Status Last Admin


Dose Admin


 


 Sodium Chloride


  (Nss 1000ml)  1,000 ml @ 


 999 mls/hr  Q1H1M STAT


 IV  3/11/17 00:27


 3/11/17 01:27 DC 3/11/17 01:18


999 MLS/HR


 


 Miscellaneous


  (Soap Suds Enema)  1 ea  NOW  STAT


 DE  3/11/17 01:47


 3/11/17 01:48 DC 3/11/17 03:00


1 EA











ED Course


Prior records/ancillary studies reviewed.


Triage Nursing notes reviewed.


Additional history obtained from family


The patient's history was concerning for abdominal pain. 





Differential diagnosis:


Etiologies such as postsurgical complication, constipation, urinary obstruction

, DVT, venous stasis appendicitis, diverticulitis, PUD, biliary pathology, UTI, 

pancreatitis, obstruction, mesenteric ischemia, aortic pathology, infections, 

inflammatory bowel disease, renal colic, as well as others were entertained. 





Physical examination findings:


As above.





ER treatment provided:


Patient declined pain meds or antianxiety medication


On reassessment the patient felt better.





Diagnostics interpreted by me:





The labs revealed mild anemia.  No worrisome leukocytosis, negative urine


Head catheter was easily flushed by nursing.  No urinary retention by bladder 

scan





Imaging studies:


Ultrasound negative for DVT


CT ABDOMEN & PELVIS:


Compared to 1/23/17.


Intraperitoneal free air, presumably related to recent surgery. Postoperative 

changes as seen in the


anterior pelvic wall and scrotum. Presacral stranding and fluid noted. No 

organized fluid collection is


identified. Wall thickening of the distal rectosigmoid colon raising 

possibility of colitis/proctitis.


Head catheter and air in a thickened underdistended bladder. Correlate 

clinically to exclude cystitis.


No significant hydronephrosis.


Moderate amount of stool in the colon.


Appendix is upper limits of normal, but otherwise unremarkable in appearance. 

Stranding adjacent to


the appendiceal tip likely related to pelvic process.


No significant bowel distention to suggest obstruction.


Hepatic and renal hypodensities, similar to prior study.


Bibasilar atelectatic changes. Nodular groundglass densities noted in the left 

lingula.


Additional incidental findings.


Radiologist: Anna Padilla M.D.





Consultation:


A consultation was placed with the Dr. Gordon. The case was discussed and 

diagnostics were reviewed.  He recommends treatment for constipation and follow-

up in clinic as scheduled.





Exam and history seem consistent with constipation.  Patient was given an enema 

as above with minimal relief.  Patient wanted to go.  He states he has 

magnesium citrate at home and was advised to use this.  He felt somewhat 

better.  No DVT on ultrasound.  Negative urine.  Head catheter was draining 

without difficulties.  He was advised to wear the MALDONADO hose.  He was advised to 

follow-up as scheduled with Dr. Ku this week or here in the ER sooner for 

severe pain, fevers, vomiting, problems with the Head, worsening signs or 

symptoms or as needed.By the evaluation outlined above emergent etiologies such 

as appendicitis, diverticulitis, PUD, biliary pathology, UTI, pancreatitis, 

obstruction, mesenteric ischemia, aortic pathology, infections, inflammatory 

bowel disease, renal colic, as well as others were deemed relatively unlikely. 





The pt informed about the findings as listed above. All questions were answered 

and  pleased with the treatment. Return instructions were outlined and the 

patient was discharged in stable condition. 








Referral:


The patient was referred back to their primary care physician for follow-up in 

2 to 3 days for a recheck of the current condition.





case reviewed with my Attending





Medical Decision


As above





Impression





 Primary Impression:  


 Constipation


 Additional Impressions:  


 Localized swelling of both lower legs


 Postoperative abdominal pain





Departure Information


Dispostion


Home / Self-Care





Condition


GOOD





Referrals


Noel Gutierrez MD (PCP)





Patient Instructions


My [facility]





Additional Instructions





Wear MALDONADO hose during the day.





Magnesium citrate: Drink half the bottle when you get up, if you do not have a 

bowel movement within 6 hours then drink the rest.  Stay near a toilet.





Frequently and see out your Head catheter bag. 





Acetaminophen(Tylenol) may be used for fever or pain.  Use 1000mg every six 

hours as needed.  Avoid using more than 3000mg in a 24 hour period.  





Rest and drink plenty of fluids as tolerated.





Continue current medications.





Light activity only.  No lifting.





Return to the ER immediately for worsening or persistent abdominal pain, 

vomiting, fevers, chest pains, difficulty breathing, worsening of your condition

, or as needed.





Follow up with your primary physician and urologist in 2-3 days for a recheck 

of your current condition.





Problem Qualifiers








 Primary Impression:  


 Constipation


 Constipation type:  unspecified constipation type  Qualified Codes:  K59.00 - 

Constipation, unspecified

## 2017-03-16 NOTE — DISCHARGE SUMMARY
Discharge Summary


Date of Service


Mar 16, 2017.





Discharge Summary


Admission Date:


Mar 7, 2017 at 10:41


Discharge Date:  Mar 8, 2017


Discharge Disposition:  Home


Principal Diagnosis:


prostate cancer


Procedures:


robotic prostatectomy with lymph node dissection





Medication Reconciliation


New Medications:  


Ciprofloxacin Hcl (Cipro) 500 Mg Tab


500 MG PO BID, #10 TAB 0 Refills


Start 2 days prior to potter catheter removal.


Acetaminophen/Codeine (Tylenol W/Codeine #3) 300 Mg/30 Mg Tab


1-2 TAB PO Q4H PRN for Pain, #20 TAB 0 Refills





Docusate Sodium (Docusate Sodium) 100 Mg Cap


100 MG PO BID PRN for Constipation, #60 CAP 0 Refills





Oxybutynin Chloride (Oxybutynin Chloride) 5 Mg Tab


5 MG PO Q8 PRN for BLADDER SPASMS, #30 TAB 0 Refills





 


Continued Medications:  


Atorvastatin (Lipitor) 10 Mg Tab


1 TAB PO QPM for 30 Days, TAB 5 Refills





Calcium Carbonate (Tums) 500 Mg Chew


1-2 TAB PO PRN





Ocuvite Preservision (Ocuvite Preservision) 1 Tab Tab


1 TAB PO QAM, TAB











Hospital Course


Pt admitted for a robotic prostatectomy. Details of that procedure as dictated 

previously in the operative report, however, in summary, he tolerated the 

procedure very well and was transferred to the floor in stable condition.  He 

progressed well overnight and was discharged home on post operative day #1 

after his drain was removed.


Total time spent on discharge = 


This includes examination of the patient, discharge planning, medication 

reconciliation, and communication with other providers.





Discharge Instructions


Please see previously written d/c instructions

## 2017-10-17 ENCOUNTER — HOSPITAL ENCOUNTER (OUTPATIENT)
Dept: HOSPITAL 45 - C.ONC | Age: 67
Discharge: HOME | End: 2017-10-17
Attending: PHYSICIAN ASSISTANT
Payer: COMMERCIAL

## 2017-10-17 VITALS
DIASTOLIC BLOOD PRESSURE: 78 MMHG | HEART RATE: 72 BPM | OXYGEN SATURATION: 98 % | SYSTOLIC BLOOD PRESSURE: 144 MMHG | TEMPERATURE: 98.42 F

## 2017-10-17 DIAGNOSIS — Z08: Primary | ICD-10-CM

## 2017-10-17 DIAGNOSIS — Z92.3: ICD-10-CM

## 2017-10-17 DIAGNOSIS — Z85.46: ICD-10-CM

## 2017-10-17 NOTE — RADIATION ONCOLOGY FOLLOW-UP
Radiation Oncology Follow-Up


Date of Visit


Oct 17, 2017.





Reason For Visit


One-month follow-up and cancer survivorship care plan





Radiation Completion Date


9/6/17





Diagnosis





(1) Prostate cancer


Status:  Acute        Onset Date:  1/11/2017


Location:  both lobes of the prostate


Histology Subtype:  adenocarcinoma


Stage:  lll


Permanent Comment:  Rising PSA, pretreatment PSA 12.3


Status post ultrasound-guided biopsies 1/11/2017


Ismael 3+3 and 4+3


Volume 39.9


Density 0.308


Status post prostatectomy 03/07/2017


Adenocarcinoma [place] 4+3 with tertiary 5


Positive margin at the apex and extraprostatic extension


Stage pT3a pN0


Decipher score at 0.65, high risk category


Status post completion of radiation therapy 09/06/2017.  He received 7020 cGy  

Last Edited By: Lida Wolf on Sep 14, 2017 15:48





History of Present Illness


Mr. Boswell  recently presented with an elevated PSA of 9.84 on 11/19/2016. The 

patient


did have a repeat PSA of 12.3 on 01/09/2017. The patient was subsequently 

referred to Dr. Zeke Everett who


recommended a transrectal ultrasound-guided biopsy of the prostate gland. The 

patient underwent a transrectal


ultrasound-guided biopsy of the prostate gland on 01/11/2017. The prostate 

gland was measured to be 39.9 cc. The


pathology revealed prostate adenocarcinoma that was [place] 4+3 and Ismael 3+

4. In total, there were 5/14 cores


positive for prostate cancer. There was evidence of perineural invasion. The 

patient did have a bone scan on 01/19/2017


and a CT abdomen/pelvis on 01/23/2017 which showed no evidence of distant 

metastatic disease. The patient was seen


by Dr. Ku who discussed surgery and radiation therapy is options for 

treatment. We are now seeing the patient


in consultation discussed role of radiation therapy.





All options were discussed with the patient.  His decision was to undergo a 

prostatectomy.  On 03/07/2017 he had a robotic-assisted prostatectomy.  This 

revealed an adenocarcinoma with a Ismael of 4+3 and tertiary 5.  Approximate 65

% of the tissue submitted and was involved by adenocarcinoma.  There was 

extraprostatic extension present.  This was at multiple sites.  Seminal 

vesicles were not involved.  There was a positive margin at the apex.  There 

was no lymphovascular or perineural invasion.  2 lymph nodes were evaluated and 

were negative for metastatic disease.  This was staged pT3a pN0.  He has had 

recheck PSAs.  He had a PSA 04/11/2017 and this was 0.04.  A PSA 06/01/2017 was 

0.02.  A Decipher prostate cancer evaluation was completed.  This was found to 

be high at 0.65.  This put him in a high risk category.





He was referred back to our office to undergo adjuvant radiation therapy.  

Radiation was completed 09/06/2017.  He received 7020 cGy.





Interim History


The patient completed his radiation therapy on 09/06/2017.  He then had a PSA 

on September 11.  This was found to be at 0.06.  PSA prior on 06/01/2017 was 

0.02.  Patient has been concerned about the rise in the PSA.  He continues to 

take Ditropan once daily.  He feels this helps with his urinary symptoms.  Last 

Friday he did go on a trip to [place].  He unfortunately had a lot of 

urinary frequency that particular day.  Urination curd every 2 hours.  He then 

decided on Sunday with the return trip he would take a Ditropan 7 AM.  With 

doing this he had no difficulty with his urination for the entire trip.  He has 

not had a problem with urination since returning from the trip.  He has had 

some indigestion.  Excess belching.  Denies nausea.  He gave an AUA score of 3 

today.  Completed and expanded prostate cancer index for clinical practice and 

gave a score of 0 of 12 urinary incontinence symptoms.  He gave a score of 3 of 

12 urinary irritation symptoms.  He gave a score of 0 12 bowel symptoms.  He 

gave a score of 1212 and sexual symptoms.  He gave a score of 2 of 12 and 

hormonal vitality symptoms.  His total was 17 of 60.





Allergies


Coded Allergies:  


     No Known Allergies (Unverified , 3/11/17)





Home Medications


Scheduled


Atorvastatin (Lipitor), 1 TAB PO QPM


Ocuvite Preservision (Ocuvite Preservision), 1 TAB PO QAM


Oxybutynin Chloride (Ditropan), 5 MG PO DAILY


Polyethylene Glycol 3350 (Miralax), 17 GM PO Q2D


Psyllium (Metamucil), 1 TBS PO BID





Scheduled PRN


Tadalafil (Cialis), 10 MG PO HS PRN for ED





Review of Systems


Gastrointestinal:  


   Symptoms:  WNL


   GI Comments:  Rectal spotting w/wiping x 1 week after RT - hemorroids per pt;


Oral:  


   Symptoms:  No Problems


   Other Oral Symptoms:  Takes metamucil;Takes miralax QOD to keep bowels 

regular;


Respiratory:  


   Symptoms:  WNL


Urinary:  


   Symptoms:  WNL


   Comments:  1 void/night;


Skin:  


   Symptoms:  No Problems


Additional Notes:


He completed a distress management report and answered "no" to all questions.





Physical Exam





Vital Signs








  Date Time  Temp Pulse Resp B/P (MAP) Pulse Ox O2 Delivery O2 Flow Rate FiO2


 


10/17/17 14:45 36.9 72 20 144/78 98   








Fatigue:  None


General Appearance:  no apparent distress


Eyes:  normal inspection, EOMI


ENT:  normal ENT inspection, hearing grossly normal


Respiratory/Chest:  lungs clear, no respiratory distress, no accessory muscle 

use


Cardiovascular:  regular rate, rhythm, no gallop, no murmur


Abdomen:  non tender, soft, no organomegaly


Extremities:  no pedal edema


Neurologic/Psychiatric:  no motor/sensory deficits, alert, normal mood/affect


Skin:  no rash





Laboratory Studies











Test


  10/17/17


15:20


 


Prostate Specific Antigen


  0.075 ng/ml


(0.000-4.000)











Assessment & Plan


Plan: A PSA to was drawn today.  He is going to call tomorrow for the results.  

We discussed the urinary symptoms.  This may have been related to something 

that he had eaten.  He'll continue the Ditropan 1 daily.  He can take one in 

the morning should he notice urinary symptoms have increased.  We discussed the 

indigestion.  He may have some mild gastritis or GI upset from is concerned and 

worry about the elevated PSA.  He has been taking Rolaids twice daily.  He has 

a recheck appointment with Dr. Ku in December.  He'll a recheck PSA prior 

to that visit.  We asked him to return to our office in 6 months.  Today we 

completed a cancer survivorship care plan.  A copy of the document was given 

the patient.  He was also given a survivorship booklet.





Assessment & Plan (Attending)


ADDENDUM: I agree with note created by Lida Wolf PA-C. I reviewed the 

patient's chart and information with her.  I have examined and evaluated the 

patient. I reviewed relevant clinical information and answered the patient's and

/or family's questions. 





Total Time


In Follow-Up


I spent 20 minutes speaking to the patient and performing examination.  I spent 

20 minutes reviewing information, preparing the survivorship document and 

completing this note.





Total Time (Attending)


In Follow-Up


I spent 25 minutes examining and counseling the patient. 





Copy To


Noel Gutierrez MD; Krishna Ku M.D.

## 2018-02-21 ENCOUNTER — HOSPITAL ENCOUNTER (OUTPATIENT)
Dept: HOSPITAL 45 - C.ULTRBC | Age: 68
Discharge: HOME | End: 2018-02-21
Attending: NURSE PRACTITIONER
Payer: COMMERCIAL

## 2018-02-21 DIAGNOSIS — K76.89: Primary | ICD-10-CM

## 2018-02-21 NOTE — DIAGNOSTIC IMAGING REPORT
BILIARY ULTRASOUND



CLINICAL HISTORY: LIVER CYST    



COMPARISON STUDY:  CT scan performed March 2017



FINDINGS: The pancreas appears sonographically normal. The gallbladder.

Sonographically normal. No solid hepatic masses are visualized. There are

scattered tiny cysts the largest of which measured 14 mm. This is located within

the left lobe. There is no ductal dilatation. The common bile duct measured 4

mm. There is no right-sided hydronephrosis. Multiple right renal cysts are

visualized largest of which measures 6 cm



IMPRESSION:  

1. Small hepatic cysts the largest of which measures 14 mm

2. Ultrasonographically normal pancreas and gallbladder

3. No ductal dilatation 









Electronically signed by:  Thee Carrillo M.D.

2/21/2018 10:17 AM



Dictated Date/Time:  2/21/2018 10:16 AM

## 2018-04-18 ENCOUNTER — HOSPITAL ENCOUNTER (OUTPATIENT)
Dept: HOSPITAL 45 - C.ONC | Age: 68
Discharge: HOME | End: 2018-04-18
Attending: PHYSICIAN ASSISTANT
Payer: COMMERCIAL

## 2018-04-18 VITALS
HEART RATE: 61 BPM | SYSTOLIC BLOOD PRESSURE: 119 MMHG | TEMPERATURE: 97.88 F | DIASTOLIC BLOOD PRESSURE: 67 MMHG | OXYGEN SATURATION: 99 %

## 2018-04-18 DIAGNOSIS — Z85.46: ICD-10-CM

## 2018-04-18 DIAGNOSIS — Z08: Primary | ICD-10-CM

## 2018-04-18 DIAGNOSIS — Z92.3: ICD-10-CM

## 2018-04-18 NOTE — RADIATION ONCOLOGY FOLLOW-UP
Radiation Oncology Follow-Up


Date of Visit


Apr 18, 2018.





Reason For Visit


Six-month follow-up





Radiation Completion Date


9/6/17





Diagnosis





(1) Prostate cancer


Status:  Acute        Onset Date:  1/11/2017


Location:  Both lobes of the prostate


Histology Subtype:  Adenocarcinoma


Stage:  lll


Permanent Comment:  Rising PSA, pretreatment PSA 12.3


Status post ultrasound-guided biopsies 1/11/2017


[place] 3+3 and 4+3


Volume 39.9


Density 0.308


Status post prostatectomy 03/07/2017


Adenocarcinoma Ismael 4+3 with tertiary 5


Positive margin at the apex and extraprostatic extension


Stage pT3a pN0


Decipher score at 0.65, high risk category


No hormone suppression


Status post completion of radiation therapy 09/06/2017.  He received 7020 cGy  

Last Edited By: Lida Wolf on Apr 18, 2018 15:47





History of Present Illness


Mr. Boswell presented with an elevated PSA of 9.84 on 11/19/2016. The patient


did have a repeat PSA of 12.3 on 01/09/2017. The patient was subsequently 

referred to Dr. Zeke Everett who


recommended a transrectal ultrasound-guided biopsy of the prostate gland. The 

patient underwent a transrectal


ultrasound-guided biopsy of the prostate gland on 01/11/2017. The prostate 

gland was measured to be 39.9 cc. The


pathology revealed prostate adenocarcinoma that was Ismael 4+3 and [place] 3+

4. In total, there were 5/14 cores


positive for prostate cancer. There was evidence of perineural invasion. The 

patient did have a bone scan on 01/19/2017


and a CT abdomen/pelvis on 01/23/2017 which showed no evidence of distant 

metastatic disease. The patient was seen


by Dr. Ku who discussed surgery and radiation therapy is options for 

treatment. We are now seeing the patient


in consultation discussed role of radiation therapy.





All options were discussed with the patient.  His decision was to undergo a 

prostatectomy.  On 03/07/2017 he had a robotic-assisted prostatectomy.  This 

revealed an adenocarcinoma with a Ismael of 4+3 and tertiary 5.  Approximate 65

% of the tissue submitted and was involved by adenocarcinoma.  There was 

extraprostatic extension present.  This was at multiple sites.  Seminal 

vesicles were not involved.  There was a positive margin at the apex.  There 

was no lymphovascular or perineural invasion.  2 lymph nodes were evaluated and 

were negative for metastatic disease.  This was staged pT3a pN0.  He has had 

recheck PSAs.  He had a PSA 04/11/2017 and this was 0.04.  A PSA 06/01/2017 was 

0.02.  A Decipher prostate cancer evaluation was completed.  This was found to 

be high at 0.65.  This put him in a high risk category.





He was referred back to our office to undergo adjuvant radiation therapy.  

Radiation was completed 09/06/2017.  He received 7020 cGy.





Interim History


He has been doing well over the past 6 months.  He has had improvement in his 

urinary symptoms.  Today gave an AUA score of 2.5.  He completed and expanded 

prostate cancer index composite for clinical practice and gave a score of 0 of 

12 and urinary incontinence symptoms.  He gave a score of 1 of 12 and urinary 

irritation symptoms.  He gave a score of 0 12 and bowel symptoms.  He gave a 

score of 12 of 12 in sexual symptoms.  He gave a score of 2 of 12 and hormonal 

vitality symptoms.  His total was 15 of 60.  He had a recheck PSA November 13, 2017 and that was 0.09.  He had a PSA January 29, 2018 and that was 0.05.  His 

next PSA is planned for May.  He had one episode of enuresis approximately 2 

months ago.  This has not recurred.





Allergies


Coded Allergies:  


     No Known Allergies (Unverified , 3/11/17)





Home Medications


Scheduled


Atorvastatin (Lipitor), 1 TAB PO QPM


Docusate Sodium (Colace), 1 CAP PO Q2D


Ocuvite Preservision (Ocuvite Preservision), 1 TAB PO QAM


Polyethylene Glycol 3350 (Miralax), 17 GM PO Q2D


Psyllium (Metamucil), 1 TBS PO BID





Scheduled PRN


Tadalafil (Cialis), 10 MG PO HS PRN for ED





Review of Systems


Gastrointestinal:  


   Symptoms:  WNL


   GI Comments:  Managed with OTC medications


Oral:  


   Symptoms:  No Problems


Respiratory:  


   Symptoms:  WNL


Urinary:  


   Symptoms:  WNL


   Comments:  See AUA & EPIC


Skin:  


   Symptoms:  No Problems





Physical Exam





Vital Signs








  Date Time  Temp Pulse Resp B/P (MAP) Pulse Ox O2 Delivery O2 Flow Rate FiO2


 


4/18/18 13:04 36.6 61 16 119/67 99   








Fatigue:  None


General Appearance:  no apparent distress


Eyes:  normal inspection, EOMI


ENT:  normal ENT inspection, hearing grossly normal


Neck:  no adenopathy, thyroid normal


Respiratory/Chest:  lungs clear, no respiratory distress, no accessory muscle 

use


Cardiovascular:  regular rate, rhythm, no gallop, no murmur


Abdomen:  non tender, soft, no organomegaly


Extremities:  no pedal edema


Neurologic/Psychiatric:  no motor/sensory deficits, alert, normal mood/affect


Skin:  warm/dry





Pain Management


Patient Reports Pain:  No


Pain Management Plan


He denies pain therefore requires no pain management.





Laboratory


Laboratory Results:  were reviewed


Laboratory Comments:


Reviewed in the interim history.





Pathology


Pathology Results:  were reviewed, and pertinent findings noted in HPI





Imaging


Imaging Studies:  not applicable





Assessment & Plan


Plan: The patient is also seen today by Dr. Fuller.  Laboratory studies were 

reviewed.  He will have his next PSA in May.  Continue regular follow-up with 

Dr. Ku and his primary care provider.  We discussed Kegel exercises to 

prevent any recurrence of enuresis.  We asked him to return to our office in 6 

months.  He may call if he has any questions or concerns in the interim.





Assessment & Plan (Attending)


I agree with note created by Lida Wolf PA-C. I reviewed the patient's 

chart and information with her.  I have examined and evaluated the patient. I 

reviewed relevant clinical information and answered the patient's and/or family'

s questions. 





Total Time


In Follow-Up


I spent 20 minutes speaking to the patient in performing examination.  I spent 

15 minutes reviewing information and completing this note.  AK





Total Time (Attending)


In Follow-Up


I spent 15 minutes examining and counseling the patient. 





Copy To


Noel Gutierrez MD; Krishna Ku M.D.
